# Patient Record
Sex: MALE | Race: WHITE | Employment: UNEMPLOYED | URBAN - METROPOLITAN AREA
[De-identification: names, ages, dates, MRNs, and addresses within clinical notes are randomized per-mention and may not be internally consistent; named-entity substitution may affect disease eponyms.]

---

## 2018-08-10 PROBLEM — E66.01 SEVERE OBESITY (BMI 35.0-39.9): Status: ACTIVE | Noted: 2018-08-10

## 2020-02-18 ENCOUNTER — OFFICE VISIT (OUTPATIENT)
Dept: CARDIOLOGY CLINIC | Age: 60
End: 2020-02-18

## 2020-02-18 VITALS
HEART RATE: 79 BPM | DIASTOLIC BLOOD PRESSURE: 90 MMHG | HEIGHT: 69 IN | OXYGEN SATURATION: 96 % | WEIGHT: 255.4 LBS | SYSTOLIC BLOOD PRESSURE: 140 MMHG | BODY MASS INDEX: 37.83 KG/M2 | RESPIRATION RATE: 20 BRPM

## 2020-02-18 DIAGNOSIS — M79.89 LEG SWELLING: Primary | ICD-10-CM

## 2020-02-18 NOTE — PROGRESS NOTES
Abdoul Levin    Chief Complaint   Patient presents with   Verlinda Smoker New Patient       History and Physical    Mr. Tierra Jones is a 59-year-old gentleman referred to our office for evaluation of bilateral lower extremity edema. He states that around the holidays last year he was on his feet for substantial period of time and when he presented to his primary care's office it was noted that he had significant swelling in his feet and legs. He states he had to get a pair shoes because it difficult for him to put his shoes on at that time. Since that time is not noted as well to this extent but has noticed some. He also complains of occasional intermittent tingling in almost cramping in his right calf. Is not associated with walking or moving but states that when he is on his feet for long period of time his legs do feel sore. He has worn compression stockings in the past but does not wear them currently. He is a cancer survivor and is still receiving treatment for malignant melanoma. He denies any chest pain or shortness of breath. Past Medical History:   Diagnosis Date    Chronic kidney disease     kidney stones    Hypertension     Kidney stone     LLQ pain     Melanoma ([address]     Muscle atrophy     Left leg pain & weakness due to torn calf muscle     Other ureteric obstruction     Shortness of breath     Skin cancer     Thyroid disease      Patient Active Problem List   Diagnosis Code    Kidney stone N20.0    Kidney stone on left side N20.0    Severe obesity (BMI 35.0-39. 9) E66.01     Past Surgical History:   Procedure Laterality Date    CYSTOSCOPY,INSERT URETERAL STENT  11/10/2013    Dr. Tim James HX COLONOSCOPY  04/04/2017    Dr. Rebolledo Ask  37/07/4307    Umbilical hernia repair & Ventral hernia repair; Dr. Angle Faria @ [facility]    HX HERNIA REPAIR      HX LITHOTRIPSY      HX UROLOGICAL Left 07/12/2017    cysto, left RPG, left URS with laser lithotripsy, and left JJ stent placement - Dr. Tawnya Wagner  UROLOGICAL  04/24/2019    cysto, L uretero w laser litho, L retro pyelogram, L ureteral stent exchange, Dr. Jay Wilhelm, [facility]     Current Outpatient Medications   Medication Sig Dispense Refill    fenofibrate nanocrystallized (TRICOR) 145 mg tablet   3    potassium citrate (UROCIT-K) 15 mEq TbER tablet Take 1 Tab by mouth three (3) times daily (with meals). 180 Tab 3    hydrocortisone (CORTEF) 10 mg tablet take 2 tablet by oral route  every morning and 1 tablet in the afternoon. Take extra as needed for stress, up to 80 mg per day      Omega-3 Fatty Acids 60- mg cpDR Take  by mouth.  simvastatin (ZOCOR) 10 mg tablet Take  by mouth nightly.  ondansetron (ZOFRAN ODT) 4 mg disintegrating tablet Take 1 Tab by mouth every eight (8) hours as needed for Nausea. 20 Tab 1    levothyroxine (SYNTHROID) 175 mcg tablet Take 175 mcg by mouth Daily (before breakfast).  nivolumab (OPDIVO IV) by IntraVENous route.  docusate sodium (COLACE) 100 mg capsule Take 100 mg by mouth two (2) times a day.  TRUE METRIX GLUCOSE METER misc   0    HYDROcodone-acetaminophen (NORCO) 5-325 mg per tablet       UNISTIK 3 COMFORT LANCET misc   11    diazePAM (VALIUM) 5 mg tablet Take one hour prior to procedure 1 Tab 0    albuterol (PROAIR HFA) 90 mcg/actuation inhaler Take 2 Puffs by inhalation.  glucose blood VI test strips (TRUE METRIX GLUCOSE TEST STRIP) strip by Not Applicable route.  SITagliptin-metFORMIN (JANUMET)  mg per tablet Take 1 Tab by mouth two (2) times daily (with meals).  Fenofibrate 120 mg tab Take  by mouth.        Allergies   Allergen Reactions    Metformin Other (comments)     Social History     Socioeconomic History    Marital status: UNKNOWN     Spouse name: Not on file    Number of children: Not on file    Years of education: Not on file    Highest education level: Not on file   Occupational History    Not on file   Social Needs    Financial resource strain: Not on file    Food insecurity:     Worry: Not on file     Inability: Not on file    Transportation needs:     Medical: Not on file     Non-medical: Not on file   Tobacco Use    Smoking status: Former Smoker     Last attempt to quit:      Years since quittin.1    Smokeless tobacco: Never Used   Substance and Sexual Activity    Alcohol use: No    Drug use: No    Sexual activity: Never   Lifestyle    Physical activity:     Days per week: Not on file     Minutes per session: Not on file    Stress: Not on file   Relationships    Social connections:     Talks on phone: Not on file     Gets together: Not on file     Attends Yazidi service: Not on file     Active member of club or organization: Not on file     Attends meetings of clubs or organizations: Not on file     Relationship status: Not on file    Intimate partner violence:     Fear of current or ex partner: Not on file     Emotionally abused: Not on file     Physically abused: Not on file     Forced sexual activity: Not on file   Other Topics Concern    Not on file   Social History Narrative    ** Merged History Encounter **           Family History   Family history unknown: Yes       Review of Systems    Review of Systems   Constitutional: Negative for chills, diaphoresis, fever, malaise/fatigue and weight loss. HENT: Negative for hearing loss and sore throat. Eyes: Negative for blurred vision, photophobia and redness. Respiratory: Negative for cough, hemoptysis, shortness of breath and wheezing. Cardiovascular: Positive for leg swelling. Negative for chest pain, palpitations and orthopnea. Gastrointestinal: Negative for abdominal pain, blood in stool, constipation, diarrhea, heartburn, nausea and vomiting. Genitourinary: Negative for dysuria, frequency, hematuria and urgency. Musculoskeletal: Negative for back pain and myalgias. Skin: Negative for itching and rash. Neurological: Negative for dizziness, speech change, focal weakness, weakness and headaches. Endo/Heme/Allergies: Does not bruise/bleed easily. Psychiatric/Behavioral: Negative for depression and suicidal ideas. Physical Exam:    Visit Vitals  /90 (BP 1 Location: Left arm, BP Patient Position: Sitting)   Pulse 79   Resp 20   Ht 5' 9\" (1.753 m)   Wt 255 lb 6.4 oz (115.8 kg)   SpO2 96%   BMI 37.72 kg/m²      Physical Examination: General appearance - alert, well appearing, and in no distress  Mental status - alert, oriented to person, place, and time  Eyes - sclera anicteric, left eye normal, right eye normal  Ears - right ear normal, left ear normal  Nose - normal and patent, no erythema, discharge or polyps  Mouth - mucous membranes moist, pharynx normal without lesions  Neck - supple, no significant adenopathy  Lymphatics - no palpable lymphadenopathy  Chest - clear to auscultation, no wheezes, rales or rhonchi, symmetric air entry  Heart - normal rate and regular rhythm  Abdomen - soft, nontender, nondistended, no masses or organomegaly  Extremities -no significant edema either extremity. Palpable pedal pulses bilaterally. Impression and Plan:    ICD-10-CM ICD-9-CM    1. Leg swelling M79.89 729.81      No orders of the defined types were placed in this encounter. Follow-up and Dispositions    · Return in about 4 weeks (around 3/17/2020) for Symptom check. I told Mr. Mortensen Files at this time I do not see any significant edema but I believe would be worth trying compression stockings. I have given a prescription for compression stockings and have provided him with Tubigrip today to try these in the interim. We will check back on him again in 1 month's time to reevaluate his symptoms and to see if they have improved with compression. The treatment plan was reviewed with the patient in detail.   The patient voiced understanding of this plan and all questions and concerns were addressed. The patient agrees with this plan. We discussed the signs and symptoms that would require earlier attention or intervention. The patient was given educational material related to his/her visit and the patient has voiced understanding of the material.     I appreciate the opportunity to participate in the care of your patient. I will be sure to keep you informed of any subsequent changes in the treatment plan. If you have any questions or concerns, please feel free to contact me. Tash Singh MD    PLEASE NOTE:  This document has been produced using voice recognition software. Unrecognized errors in transcription may be present.

## 2020-02-18 NOTE — PROGRESS NOTES
Patient presents in office today as new patient.       3 most recent PHQ Screens 2/18/2020   Little interest or pleasure in doing things Not at all   Feeling down, depressed, irritable, or hopeless Not at all   Total Score PHQ 2 0

## 2020-07-14 ENCOUNTER — VIRTUAL VISIT (OUTPATIENT)
Dept: CARDIOLOGY CLINIC | Age: 60
End: 2020-07-14

## 2020-07-14 DIAGNOSIS — M79.89 LEG SWELLING: Primary | ICD-10-CM

## 2020-07-14 NOTE — PROGRESS NOTES
Fatuma Escudero is a 63 y.o. male who was seen by synchronous (real-time) audio-video technology on 7/14/2020 for Leg Swelling        Assessment & Plan:   Diagnoses and all orders for this visit:    1. Leg swelling      I had a long discussion with Mr. Sebastien Osorio in regards to his leg swelling and neuropathy. We discussed that his leg swelling may have worsened as he is been on his feet more and I recommended that he wear his compression stockings more than just when he is at work and try to put them on the begin the day. In addition, we discussed that his leg swelling may be contributing to intravascular volume depletion which in combination with his low water intake may be leading to dehydration. I believe this may be the cause of his frequent night cramping that he is experiencing. He is in agreement with this plan he will try to wear compression stockings more regularly and increase his p.o. intake. He has also been prescribed a new medication for restless leg syndrome and I will see there is any connection between this and dehydration as well. Finally I will see him in 1 month's time to do a formal pulse exam make sure his numbness in his foot is solely related to neuropathy and not any arterial perfusion issues. I spent at least 10 minutes on this visit with this established patient. 712  Subjective:       Prior to Admission medications    Medication Sig Start Date End Date Taking? Authorizing Provider   potassium citrate (UROCIT-K) 15 mEq TbER tablet TAKE 1 TABLET BY MOUTH THREE TIMES DAILY WITH MEALS 2/27/20  Yes Simran Monroe MD   fenofibrate nanocrystallized (TRICOR) 145 mg tablet  4/8/19  Yes Provider, Historical   diazePAM (VALIUM) 5 mg tablet Take one hour prior to procedure 4/30/19  Yes Simran Monroe MD   albuterol [facility] HFA) 90 mcg/actuation inhaler Take 2 Puffs by inhalation.    Yes Provider, Historical   hydrocortisone (CORTEF) 10 mg tablet take 2 tablet by oral route  every morning and 1 tablet in the afternoon. Take extra as needed for stress, up to 80 mg per day 3/4/19  Yes Provider, Historical   Omega-3 Fatty Acids 60- mg cpDR Take  by mouth. Yes Provider, Historical   simvastatin (ZOCOR) 10 mg tablet Take  by mouth nightly. Yes Provider, Historical   Fenofibrate 120 mg tab Take  by mouth. Yes Provider, Historical   ondansetron (ZOFRAN ODT) 4 mg disintegrating tablet Take 1 Tab by mouth every eight (8) hours as needed for Nausea. 2/21/19  Yes Tegan Reese NP   levothyroxine (SYNTHROID) 175 mcg tablet Take 175 mcg by mouth Daily (before breakfast). Yes Provider, Historical   docusate sodium (COLACE) 100 mg capsule Take 100 mg by mouth two (2) times a day. Yes Provider, Historical   TRUE METRIX GLUCOSE METER [facility]  4/3/19   Provider, Historical   HYDROcodone-acetaminophen (NORCO) 5-325 mg per tablet  4/24/19   Provider, Historical   UNISTIK 3 COMFORT LANCET mis  4/16/19   Provider, Historical   glucose blood VI test strips (TRUE METRIX GLUCOSE TEST STRIP) strip by Not Applicable route. 4/8/19   Provider, Historical   SITagliptin-metFORMIN (JANUMET)  mg per tablet Take 1 Tab by mouth two (2) times daily (with meals). Provider, Historical   nivolumab (OPDIVO IV) by IntraVENous route. Provider, Historical     Patient Active Problem List   Diagnosis Code    Kidney stone N20.0    Kidney stone on left side N20.0    Severe obesity (BMI 35.0-39. 9) E66.01     Patient Active Problem List    Diagnosis Date Noted    Severe obesity (BMI 35.0-39.9) 08/10/2018    Kidney stone     Kidney stone on left side 11/20/2013     Current Outpatient Medications   Medication Sig Dispense Refill    potassium citrate (UROCIT-K) 15 mEq TbER tablet TAKE 1 TABLET BY MOUTH THREE TIMES DAILY WITH MEALS 180 Tab 3    fenofibrate nanocrystallized (TRICOR) 145 mg tablet   3    diazePAM (VALIUM) 5 mg tablet Take one hour prior to procedure 1 Tab 0    albuterol (PROAIR HFA) 90 mcg/actuation inhaler Take 2 Puffs by inhalation.  hydrocortisone (CORTEF) 10 mg tablet take 2 tablet by oral route  every morning and 1 tablet in the afternoon. Take extra as needed for stress, up to 80 mg per day      Omega-3 Fatty Acids 60- mg cpDR Take  by mouth.  simvastatin (ZOCOR) 10 mg tablet Take  by mouth nightly.  Fenofibrate 120 mg tab Take  by mouth.  ondansetron (ZOFRAN ODT) 4 mg disintegrating tablet Take 1 Tab by mouth every eight (8) hours as needed for Nausea. 20 Tab 1    levothyroxine (SYNTHROID) 175 mcg tablet Take 175 mcg by mouth Daily (before breakfast).  docusate sodium (COLACE) 100 mg capsule Take 100 mg by mouth two (2) times a day.  TRUE METRIX GLUCOSE METER misc   0    HYDROcodone-acetaminophen (NORCO) 5-325 mg per tablet       UNISTIK 3 COMFORT LANCET misc   11    glucose blood VI test strips (TRUE METRIX GLUCOSE TEST STRIP) strip by Not Applicable route.  SITagliptin-metFORMIN (JANUMET)  mg per tablet Take 1 Tab by mouth two (2) times daily (with meals).  nivolumab (OPDIVO IV) by IntraVENous route.        Allergies   Allergen Reactions    Metformin Other (comments)     Past Medical History:   Diagnosis Date    Chronic kidney disease     kidney stones    Hypertension     Kidney stone     LLQ pain     Melanoma ([address]     Muscle atrophy     Left leg pain & weakness due to torn calf muscle     Other ureteric obstruction     Shortness of breath     Skin cancer     Thyroid disease      Past Surgical History:   Procedure Laterality Date    CYSTOSCOPY,INSERT URETERAL STENT  11/10/2013    Dr. Tejas Islas HX COLONOSCOPY  04/04/2017    Dr. Regalado Corporal  02/03/6629    Umbilical hernia repair & Ventral hernia repair; Dr. Mauricio Diaz @ [facility]    HX HERNIA REPAIR      HX LITHOTRIPSY      HX UROLOGICAL Left 07/12/2017    cysto, left RPG, left URS with laser lithotripsy, and left JJ stent placement - Dr. Ngozi Nielsen  UROLOGICAL  2019    cysto, L uretero w laser litho, L retro pyelogram, L ureteral stent exchange, Dr. Vivek Ramsey, [facility]     Family History   Family history unknown: Yes     Social History     Tobacco Use    Smoking status: Former Smoker     Last attempt to quit: 2002     Years since quittin.5    Smokeless tobacco: Never Used   Substance Use Topics    Alcohol use: No       Review of Systems   Constitutional: Negative for chills, diaphoresis, fever, malaise/fatigue and weight loss. HENT: Negative for hearing loss and sore throat. Eyes: Negative for blurred vision, photophobia and redness. Respiratory: Negative for cough, hemoptysis, shortness of breath and wheezing. Cardiovascular: Positive for leg swelling. Negative for chest pain, palpitations and orthopnea. Gastrointestinal: Negative for abdominal pain, blood in stool, constipation, diarrhea, heartburn, nausea and vomiting. Genitourinary: Negative for dysuria, frequency, hematuria and urgency. Musculoskeletal: Negative for back pain and myalgias. Skin: Negative for itching and rash. Neurological: Positive for sensory change. Negative for dizziness, speech change, focal weakness, weakness and headaches. Endo/Heme/Allergies: Does not bruise/bleed easily. Psychiatric/Behavioral: Negative for depression and suicidal ideas. Objective:   No flowsheet data found. General: alert, cooperative, no distress   Mental  status: normal mood, behavior, speech, dress, motor activity, and thought processes, able to follow commands   HENT: NCAT   Neck: no visualized mass   Resp: no respiratory distress   Neuro: no gross deficits   Skin: no discoloration or lesions of concern on visible areas   Psychiatric: normal affect, consistent with stated mood, no evidence of hallucinations     Additional exam findings:        We discussed the expected course, resolution and complications of the diagnosis(es) in detail. Medication risks, benefits, costs, interactions, and alternatives were discussed as indicated. I advised him to contact the office if his condition worsens, changes or fails to improve as anticipated. He expressed understanding with the diagnosis(es) and plan. Nolan Chacon, who was evaluated through a patient-initiated, synchronous (real-time) audio-video encounter, and/or his healthcare decision maker, is aware that it is a billable service, with coverage as determined by his insurance carrier. He provided verbal consent to proceed: Yes, and patient identification was verified. It was conducted pursuant to the emergency declaration under the [address] [place] [address], [address] [place] [address] authority and the [employer] Act. A caregiver was present when appropriate. Ability to conduct physical exam was limited. I was in the office. The patient was at home.       Ras Sandhu MD

## 2020-07-14 NOTE — PROGRESS NOTES
1. Have you been to the ER, urgent care clinic since your last visit? Hospitalized since your last visit? No    2. Have you seen or consulted any other health care providers outside of the [address] [place] [address] since your last visit? Include any pap smears or colon screening.  No

## 2020-07-28 PROBLEM — E03.9 HYPOTHYROIDISM, UNSPECIFIED: Status: ACTIVE | Noted: 2018-04-23

## 2020-07-28 PROBLEM — C43.9 MALIGNANT MELANOMA OF SKIN, UNSPECIFIED (HCC): Status: ACTIVE | Noted: 2020-07-28

## 2020-07-28 PROBLEM — Z00.00 ENCNTR FOR GENERAL ADULT MEDICAL EXAM W/O ABNORMAL FINDINGS: Status: ACTIVE | Noted: 2020-07-28

## 2020-07-28 PROBLEM — E11.65 HYPERGLYCEMIA DUE TO TYPE 2 DIABETES MELLITUS (HCC): Status: ACTIVE | Noted: 2017-02-22

## 2020-07-28 PROBLEM — E11.9 CONTROLLED TYPE 2 DIABETES MELLITUS WITHOUT COMPLICATION, WITHOUT LONG-TERM CURRENT USE OF INSULIN (HCC): Status: ACTIVE | Noted: 2019-02-18

## 2020-07-28 PROBLEM — I95.9 HYPOTENSION, UNSPECIFIED: Status: ACTIVE | Noted: 2020-07-28

## 2020-07-28 PROBLEM — L03.90 CELLULITIS AND ABSCESS: Status: ACTIVE | Noted: 2020-07-28

## 2020-07-28 PROBLEM — R06.00 DYSPNEA: Status: ACTIVE | Noted: 2020-07-28

## 2020-07-28 PROBLEM — R31.9 HEMATURIA, UNSPECIFIED: Status: ACTIVE | Noted: 2020-07-28

## 2020-07-28 PROBLEM — E27.1 ADRENAL INSUFFICIENCY (ADDISON'S DISEASE) (HCC): Status: ACTIVE | Noted: 2019-04-24

## 2020-07-28 PROBLEM — L60.0 INGROWING NAIL: Status: ACTIVE | Noted: 2020-07-28

## 2020-07-28 PROBLEM — R31.0 GROSS HEMATURIA: Status: ACTIVE | Noted: 2020-07-28

## 2020-07-28 PROBLEM — L25.9 CONTACT DERMATITIS AND OTHER ECZEMA: Status: ACTIVE | Noted: 2020-07-28

## 2020-07-28 PROBLEM — R10.32 ABDOMINAL PAIN, LEFT LOWER QUADRANT: Status: ACTIVE | Noted: 2020-07-28

## 2020-07-28 PROBLEM — G89.4 CHRONIC PAIN DISORDER: Status: ACTIVE | Noted: 2020-07-28

## 2020-07-28 PROBLEM — Z85.820 HISTORY OF MELANOMA: Status: ACTIVE | Noted: 2018-04-13

## 2020-07-28 PROBLEM — R94.4 ABNORMAL RESULTS OF KIDNEY FUNCTION STUDIES: Status: ACTIVE | Noted: 2020-07-28

## 2020-07-28 PROBLEM — R03.0 ELEVATED BLOOD-PRESSURE READING WITHOUT DIAGNOSIS OF HYPERTENSION: Status: ACTIVE | Noted: 2020-07-28

## 2020-07-28 PROBLEM — I10 ESSENTIAL HYPERTENSION: Status: ACTIVE | Noted: 2017-02-13

## 2020-07-28 PROBLEM — K29.00 ACUTE GASTRITIS: Status: ACTIVE | Noted: 2020-07-28

## 2020-07-28 PROBLEM — R19.7 DIARRHEA: Status: ACTIVE | Noted: 2020-07-28

## 2020-07-28 PROBLEM — L02.91 CELLULITIS AND ABSCESS: Status: ACTIVE | Noted: 2020-07-28

## 2020-07-28 PROBLEM — R35.0 FREQUENCY OF URINATION: Status: ACTIVE | Noted: 2020-07-28

## 2020-07-28 PROBLEM — K62.5 BRBPR (BRIGHT RED BLOOD PER RECTUM): Status: ACTIVE | Noted: 2017-02-13

## 2020-09-09 ENCOUNTER — OFFICE VISIT (OUTPATIENT)
Dept: VASCULAR SURGERY | Age: 60
End: 2020-09-09

## 2020-09-09 VITALS
OXYGEN SATURATION: 96 % | DIASTOLIC BLOOD PRESSURE: 100 MMHG | SYSTOLIC BLOOD PRESSURE: 150 MMHG | HEART RATE: 91 BPM | RESPIRATION RATE: 20 BRPM

## 2020-09-09 DIAGNOSIS — I73.9 PERIPHERAL ARTERIAL DISEASE (HCC): Primary | ICD-10-CM

## 2020-09-09 DIAGNOSIS — M79.89 LEG SWELLING: ICD-10-CM

## 2020-09-09 NOTE — PROGRESS NOTES
1. Have you been to the ER, urgent care clinic since your last visit? Hospitalized since your last visit? No    2. Have you seen or consulted any other health care providers outside of the [address] [place] [address] since your last visit? Include any pap smears or colon screening.  Yes Reason for visit: pcp,        3 most recent PHQ Screens 9/9/2020   Little interest or pleasure in doing things Not at all   Feeling down, depressed, irritable, or hopeless Not at all   Total Score PHQ 2 0

## 2020-09-09 NOTE — PROGRESS NOTES
Katelyn Dempsey    Chief Complaint   Patient presents with    Leg Swelling       History and Physical    Mr. Katharine Suarez returns to the office today for continued follow-up management of his bilateral leg swelling. He states that he recently seen a pain management specialist was adjusted with medications including taking him off his anti-inflammatory. He states that since then he has noticed worsening of his leg swelling. He does wear his compression stockings when he is up and about on his feet but did not wear them today. He also continues to have neuropathy in the bottom of both his feet and some discomfort in the back of his calf with walking. He states the pain goes away after resting for about 5 minutes. He cannot give an exact distance before this occurs. He is a former smoker. He was diagnosed with diabetes during his cancer treatment but has since resolved since his treatment is completed. He has no other complaints. Past Medical History:   Diagnosis Date    Chronic kidney disease     kidney stones    Hypertension     Kidney stone     LLQ pain     Melanoma ([address]     Muscle atrophy     Left leg pain & weakness due to torn calf muscle     Other ureteric obstruction     Shortness of breath     Skin cancer     Thyroid disease      Patient Active Problem List   Diagnosis Code    Kidney stone N20.0    Kidney stone on left side N20.0    Severe obesity (BMI 35.0-39. 9) E66.01    Abdominal pain R10.9    Abdominal pain, left lower quadrant R10.32    Abnormal results of kidney function studies R94.4    Acute gastritis K29.00    Adrenal insufficiency ([place]'s disease) ([facility]) E27.1    Essential hypertension I10    Diarrhea R19.7    Controlled type 2 diabetes mellitus without complication, without long-term current use of insulin ([facility]) E11.9    Contact dermatitis and other eczema L25.9    Chronic pain disorder G89.4    Cellulitis and abscess L03.90, L02.91    BRBPR (bright red blood per rectum) K62.5    Brachial plexus lesions G54.0    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    Dyspnea R06.00    Elevated blood-pressure reading without diagnosis of hypertension R03.0    Encntr for general adult medical exam w/o abnormal findings Z00.00    Frequency of urination R35.0    Gross hematuria R31.0    Hematuria, unspecified R31.9    Hernia of anterior abdominal wall K43.9    History of melanoma Z85.820    Hyperglycemia due to type 2 diabetes mellitus (HCC) E11.65    Hypotension, unspecified I95.9    Hypothyroidism, unspecified E03.9    Ingrowing nail L60.0    Thoracic or lumbosacral neuritis or radiculitis [id number]    Malignant melanoma of skin, unspecified ([facility] [address] C43.9     Past Surgical History:   Procedure Laterality Date    CYSTOSCOPY,INSERT URETERAL STENT  11/10/2013    Dr. Robina Al HX COLONOSCOPY  2017    Dr. José Miguel Cummings  [id number]    Umbilical hernia repair & Ventral hernia repair; Dr. Angel Nuñez @ [facility]    HX HERNIA REPAIR      HX LITHOTRIPSY      HX UROLOGICAL Left 2017    cysto, left RPG, left URS with laser lithotripsy, and left JJ stent placement - Dr. Shon Cerna HX UROLOGICAL  2019    cysto, L uretero w laser litho, L retro pyelogram, L ureteral stent exchange, Dr. Tiara Hernandez, [facility]     Current Outpatient Medications   Medication Sig Dispense Refill    potassium citrate (UROCIT-K) 15 mEq TbER tablet TAKE 1 TABLET BY MOUTH THREE TIMES DAILY WITH MEALS 270 Tab 3    SF 5000 Plus 1.1 % crea BRUSH PRIOR TO BEDTIME FOR 1 MINUTE. DO NOT RINSE SPIT EXCESS      meloxicam (MOBIC) 15 mg tablet TAKE 1 TABLET BY MOUTH ONCE DAILY      TRUE METRIX GLUCOSE METER misc   0    UNISTIK 3 COMFORT LANCET misc   11    albuterol (PROAIR HFA) 90 mcg/actuation inhaler Take 2 Puffs by inhalation.  hydrocortisone (CORTEF) 10 mg tablet take 2 tablet by oral route  every morning and 1 tablet in the afternoon.  Take extra as needed for stress, up to 80 mg per day      glucose blood VI test strips (TRUE METRIX GLUCOSE TEST STRIP) strip by Not Applicable route.  Omega-3 Fatty Acids 60- mg cpDR Take  by mouth.  Fenofibrate 120 mg tab Take  by mouth.  levothyroxine (SYNTHROID) 175 mcg tablet Take 175 mcg by mouth Daily (before breakfast).  docusate sodium (COLACE) 100 mg capsule Take 100 mg by mouth two (2) times a day.  gabapentin (NEURONTIN) 100 mg capsule Take 100 mg by mouth three (3) times daily.  Vascepa 1 gram capsule TAKE 2 CAPSULES BY MOUTH TWICE DAILY WITH FOOD SWALLOW WHOLE DO NOT CHEW OPEN DISSOLVE AND OR CRUSH      SITagliptin-metFORMIN (JANUMET)  mg per tablet Take 1 Tab by mouth two (2) times daily (with meals).  simvastatin (ZOCOR) 10 mg tablet Take  by mouth nightly.  nivolumab (OPDIVO IV) by IntraVENous route.        Allergies   Allergen Reactions    Metformin Other (comments)     Social History     Socioeconomic History    Marital status: UNKNOWN     Spouse name: Not on file    Number of children: Not on file    Years of education: Not on file    Highest education level: Not on file   Occupational History    Not on file   Social Needs    Financial resource strain: Not on file    Food insecurity     Worry: Not on file     Inability: Not on file    Transportation needs     Medical: Not on file     Non-medical: Not on file   Tobacco Use    Smoking status: Former Smoker     Last attempt to quit: 2002     Years since quittin.7    Smokeless tobacco: Never Used   Substance and Sexual Activity    Alcohol use: No    Drug use: No    Sexual activity: Never   Lifestyle    Physical activity     Days per week: Not on file     Minutes per session: Not on file    Stress: Not on file   Relationships    Social connections     Talks on phone: Not on file     Gets together: Not on file     Attends Sikh service: Not on file     Active member of club or organization: Not on file     Attends meetings of clubs or organizations: Not on file     Relationship status: Not on file    Intimate partner violence     Fear of current or ex partner: Not on file     Emotionally abused: Not on file     Physically abused: Not on file     Forced sexual activity: Not on file   Other Topics Concern    Not on file   Social History Narrative    ** Merged History Encounter **           Family History   Family history unknown: Yes       Review of Systems    Review of Systems   Constitutional: Negative for chills, diaphoresis, fever, malaise/fatigue and weight loss. HENT: Negative for hearing loss and sore throat. Eyes: Negative for blurred vision, photophobia and redness. Respiratory: Negative for cough, hemoptysis, shortness of breath and wheezing. Cardiovascular: Positive for claudication and leg swelling. Negative for chest pain, palpitations and orthopnea. Gastrointestinal: Negative for abdominal pain, blood in stool, constipation, diarrhea, heartburn, nausea and vomiting. Genitourinary: Negative for dysuria, frequency, hematuria and urgency. Musculoskeletal: Negative for back pain and myalgias. Skin: Negative for itching and rash. Neurological: Negative for dizziness, speech change, focal weakness, weakness and headaches. Endo/Heme/Allergies: Does not bruise/bleed easily. Psychiatric/Behavioral: Negative for depression and suicidal ideas.             Physical Exam:    Visit Vitals  BP (!) 150/100 (BP 1 Location: Left arm, BP Patient Position: Sitting)   Pulse 91   Resp 20   SpO2 96%      Physical Examination: General appearance - alert, well appearing, and in no distress  Mental status - alert, oriented to person, place, and time  Eyes - sclera anicteric, left eye normal, right eye normal  Ears - right ear normal, left ear normal  Nose - normal and patent, no erythema, discharge or polyps  Mouth - mucous membranes moist, pharynx normal without lesions  Neck - supple, no significant adenopathy  Lymphatics - no palpable lymphadenopathy  Chest - clear to auscultation, no wheezes, rales or rhonchi, symmetric air entry  Heart - normal rate and regular rhythm  Abdomen - soft, nontender, nondistended, no masses or organomegaly  Extremities -bilateral lower extremities with 1+ pitting edema extending down to the ankle. Palpable posterior pulse bilaterally easier to palpate on the right than the left. Unable palpate dorsalis pedis on either side. Monophasic dorsalis pedis signal with biphasic posterior tibial signals. Impression and Plan:    ICD-10-CM ICD-9-CM    1. Peripheral arterial disease (HCC)  I73.9 443.9 LOWER EXT ART PVR MULT LEVEL SEG PRESSURES   2. Leg swelling  M79.89 729.81      Had a long discussion Mr. Silvia Sawant in regards to his leg symptoms. He will continue to wear the compression stockings instructed to wear them more regularly to help with his edema. I hope that as his body adjusts his new anti-inflammatories his leg swelling will improve somewhat. In regards to his pain with walking we discussed that this could possibly be claudication and like to obtain PVRs with toe pressures to evaluate this further. We will set him up for the study and I will see him with a virtual visit in 1 month's time. If he needs intervention there is a good chance he would like to be transferred to [facility] is too far from the travel for intervention. The treatment plan was reviewed with the patient in detail. The patient voiced understanding of this plan and all questions and concerns were addressed. The patient agrees with this plan. We discussed the signs and symptoms that would require earlier attention or intervention. The patient was given educational material related to his/her visit and the patient has voiced understanding of the material.     I appreciate the opportunity to participate in the care of your patient.   I will be sure to keep you informed of any subsequent changes in the treatment plan. If you have any questions or concerns, please feel free to contact me. Lori Whittington MD    PLEASE NOTE:  This document has been produced using voice recognition software. Unrecognized errors in transcription may be present.

## 2021-08-03 PROBLEM — N20.0 KIDNEY STONE: Status: RESOLVED | Noted: 2021-08-03 | Resolved: 2021-08-03
